# Patient Record
Sex: MALE | Race: WHITE | NOT HISPANIC OR LATINO
[De-identification: names, ages, dates, MRNs, and addresses within clinical notes are randomized per-mention and may not be internally consistent; named-entity substitution may affect disease eponyms.]

---

## 2021-05-04 ENCOUNTER — RESULT REVIEW (OUTPATIENT)
Age: 71
End: 2021-05-04

## 2021-05-04 ENCOUNTER — INPATIENT (INPATIENT)
Facility: HOSPITAL | Age: 71
LOS: 2 days | Discharge: HOME | DRG: 57 | End: 2021-05-07
Attending: INTERNAL MEDICINE | Admitting: INTERNAL MEDICINE
Payer: MEDICARE

## 2021-05-04 VITALS
SYSTOLIC BLOOD PRESSURE: 110 MMHG | HEART RATE: 88 BPM | RESPIRATION RATE: 16 BRPM | OXYGEN SATURATION: 100 % | DIASTOLIC BLOOD PRESSURE: 70 MMHG | TEMPERATURE: 97 F

## 2021-05-04 DIAGNOSIS — N39.0 URINARY TRACT INFECTION, SITE NOT SPECIFIED: ICD-10-CM

## 2021-05-04 DIAGNOSIS — R62.7 ADULT FAILURE TO THRIVE: ICD-10-CM

## 2021-05-04 DIAGNOSIS — G20 PARKINSON'S DISEASE: ICD-10-CM

## 2021-05-04 DIAGNOSIS — I87.8 OTHER SPECIFIED DISORDERS OF VEINS: ICD-10-CM

## 2021-05-04 LAB
ALBUMIN SERPL ELPH-MCNC: 3.4 G/DL — SIGNIFICANT CHANGE UP (ref 3.3–5)
ALP SERPL-CCNC: 79 U/L — SIGNIFICANT CHANGE UP (ref 40–120)
ALT FLD-CCNC: 21 U/L — SIGNIFICANT CHANGE UP (ref 12–78)
ANION GAP SERPL CALC-SCNC: 7 MMOL/L — SIGNIFICANT CHANGE UP (ref 5–17)
APPEARANCE UR: ABNORMAL
APTT BLD: 32 SEC — SIGNIFICANT CHANGE UP (ref 27.5–35.5)
AST SERPL-CCNC: 28 U/L — SIGNIFICANT CHANGE UP (ref 15–37)
BASOPHILS # BLD AUTO: 0.05 K/UL — SIGNIFICANT CHANGE UP (ref 0–0.2)
BASOPHILS NFR BLD AUTO: 0.8 % — SIGNIFICANT CHANGE UP (ref 0–2)
BILIRUB SERPL-MCNC: 0.4 MG/DL — SIGNIFICANT CHANGE UP (ref 0.2–1.2)
BILIRUB UR-MCNC: NEGATIVE — SIGNIFICANT CHANGE UP
BUN SERPL-MCNC: 17 MG/DL — SIGNIFICANT CHANGE UP (ref 7–23)
CALCIUM SERPL-MCNC: 9.3 MG/DL — SIGNIFICANT CHANGE UP (ref 8.5–10.1)
CHLORIDE SERPL-SCNC: 106 MMOL/L — SIGNIFICANT CHANGE UP (ref 96–108)
CK SERPL-CCNC: 371 U/L — HIGH (ref 26–308)
CO2 SERPL-SCNC: 29 MMOL/L — SIGNIFICANT CHANGE UP (ref 22–31)
COLOR SPEC: YELLOW — SIGNIFICANT CHANGE UP
CREAT SERPL-MCNC: 0.93 MG/DL — SIGNIFICANT CHANGE UP (ref 0.5–1.3)
DIFF PNL FLD: ABNORMAL
EOSINOPHIL # BLD AUTO: 0.12 K/UL — SIGNIFICANT CHANGE UP (ref 0–0.5)
EOSINOPHIL NFR BLD AUTO: 1.9 % — SIGNIFICANT CHANGE UP (ref 0–6)
GLUCOSE SERPL-MCNC: 81 MG/DL — SIGNIFICANT CHANGE UP (ref 70–99)
GLUCOSE UR QL: NEGATIVE — SIGNIFICANT CHANGE UP
HCT VFR BLD CALC: 32.4 % — LOW (ref 39–50)
HGB BLD-MCNC: 10.5 G/DL — LOW (ref 13–17)
IMM GRANULOCYTES NFR BLD AUTO: 0.5 % — SIGNIFICANT CHANGE UP (ref 0–1.5)
INR BLD: 1.21 RATIO — HIGH (ref 0.88–1.16)
KETONES UR-MCNC: ABNORMAL
LACTATE SERPL-SCNC: 1.2 MMOL/L — SIGNIFICANT CHANGE UP (ref 0.7–2)
LEUKOCYTE ESTERASE UR-ACNC: ABNORMAL
LYMPHOCYTES # BLD AUTO: 1.38 K/UL — SIGNIFICANT CHANGE UP (ref 1–3.3)
LYMPHOCYTES # BLD AUTO: 21.3 % — SIGNIFICANT CHANGE UP (ref 13–44)
MAGNESIUM SERPL-MCNC: 2.3 MG/DL — SIGNIFICANT CHANGE UP (ref 1.6–2.6)
MCHC RBC-ENTMCNC: 30.7 PG — SIGNIFICANT CHANGE UP (ref 27–34)
MCHC RBC-ENTMCNC: 32.4 GM/DL — SIGNIFICANT CHANGE UP (ref 32–36)
MCV RBC AUTO: 94.7 FL — SIGNIFICANT CHANGE UP (ref 80–100)
MONOCYTES # BLD AUTO: 0.69 K/UL — SIGNIFICANT CHANGE UP (ref 0–0.9)
MONOCYTES NFR BLD AUTO: 10.6 % — SIGNIFICANT CHANGE UP (ref 2–14)
NEUTROPHILS # BLD AUTO: 4.21 K/UL — SIGNIFICANT CHANGE UP (ref 1.8–7.4)
NEUTROPHILS NFR BLD AUTO: 64.9 % — SIGNIFICANT CHANGE UP (ref 43–77)
NITRITE UR-MCNC: NEGATIVE — SIGNIFICANT CHANGE UP
NRBC # BLD: 0 /100 WBCS — SIGNIFICANT CHANGE UP (ref 0–0)
NT-PROBNP SERPL-SCNC: 1940 PG/ML — HIGH (ref 0–125)
PH UR: 8 — SIGNIFICANT CHANGE UP (ref 5–8)
PLATELET # BLD AUTO: 268 K/UL — SIGNIFICANT CHANGE UP (ref 150–400)
POTASSIUM SERPL-MCNC: 3.8 MMOL/L — SIGNIFICANT CHANGE UP (ref 3.5–5.3)
POTASSIUM SERPL-SCNC: 3.8 MMOL/L — SIGNIFICANT CHANGE UP (ref 3.5–5.3)
PROT SERPL-MCNC: 7.8 G/DL — SIGNIFICANT CHANGE UP (ref 6–8.3)
PROT UR-MCNC: 30 MG/DL
PROTHROM AB SERPL-ACNC: 14 SEC — HIGH (ref 10.6–13.6)
RBC # BLD: 3.42 M/UL — LOW (ref 4.2–5.8)
RBC # FLD: 12.5 % — SIGNIFICANT CHANGE UP (ref 10.3–14.5)
SARS-COV-2 RNA SPEC QL NAA+PROBE: SIGNIFICANT CHANGE UP
SODIUM SERPL-SCNC: 142 MMOL/L — SIGNIFICANT CHANGE UP (ref 135–145)
SP GR SPEC: 1.01 — SIGNIFICANT CHANGE UP (ref 1.01–1.02)
TROPONIN I SERPL-MCNC: <.015 NG/ML — SIGNIFICANT CHANGE UP (ref 0.01–0.04)
TSH SERPL-MCNC: 4.6 UIU/ML — HIGH (ref 0.36–3.74)
UROBILINOGEN FLD QL: NEGATIVE — SIGNIFICANT CHANGE UP
WBC # BLD: 6.48 K/UL — SIGNIFICANT CHANGE UP (ref 3.8–10.5)
WBC # FLD AUTO: 6.48 K/UL — SIGNIFICANT CHANGE UP (ref 3.8–10.5)

## 2021-05-04 PROCEDURE — 88304 TISSUE EXAM BY PATHOLOGIST: CPT | Mod: 26

## 2021-05-04 PROCEDURE — 93970 EXTREMITY STUDY: CPT | Mod: 26

## 2021-05-04 PROCEDURE — 99285 EMERGENCY DEPT VISIT HI MDM: CPT | Mod: CS

## 2021-05-04 PROCEDURE — 99221 1ST HOSP IP/OBS SF/LOW 40: CPT

## 2021-05-04 PROCEDURE — 93010 ELECTROCARDIOGRAM REPORT: CPT

## 2021-05-04 PROCEDURE — 71045 X-RAY EXAM CHEST 1 VIEW: CPT | Mod: 26

## 2021-05-04 RX ORDER — CARBIDOPA AND LEVODOPA 25; 100 MG/1; MG/1
1 TABLET ORAL
Qty: 0 | Refills: 0 | DISCHARGE

## 2021-05-04 RX ORDER — NYSTATIN CREAM 100000 [USP'U]/G
1 CREAM TOPICAL
Refills: 0 | Status: DISCONTINUED | OUTPATIENT
Start: 2021-05-04 | End: 2021-05-07

## 2021-05-04 RX ORDER — ACETAMINOPHEN 500 MG
650 TABLET ORAL EVERY 6 HOURS
Refills: 0 | Status: DISCONTINUED | OUTPATIENT
Start: 2021-05-04 | End: 2021-05-07

## 2021-05-04 RX ORDER — CARBIDOPA AND LEVODOPA 25; 100 MG/1; MG/1
2 TABLET ORAL
Refills: 0 | Status: DISCONTINUED | OUTPATIENT
Start: 2021-05-04 | End: 2021-05-07

## 2021-05-04 RX ORDER — CEFTRIAXONE 500 MG/1
1000 INJECTION, POWDER, FOR SOLUTION INTRAMUSCULAR; INTRAVENOUS EVERY 24 HOURS
Refills: 0 | Status: DISCONTINUED | OUTPATIENT
Start: 2021-05-04 | End: 2021-05-06

## 2021-05-04 RX ORDER — SODIUM CHLORIDE 9 MG/ML
1000 INJECTION INTRAMUSCULAR; INTRAVENOUS; SUBCUTANEOUS
Refills: 0 | Status: DISCONTINUED | OUTPATIENT
Start: 2021-05-04 | End: 2021-05-06

## 2021-05-04 RX ORDER — ENOXAPARIN SODIUM 100 MG/ML
40 INJECTION SUBCUTANEOUS DAILY
Refills: 0 | Status: DISCONTINUED | OUTPATIENT
Start: 2021-05-04 | End: 2021-05-07

## 2021-05-04 RX ORDER — CEFTRIAXONE 500 MG/1
1000 INJECTION, POWDER, FOR SOLUTION INTRAMUSCULAR; INTRAVENOUS ONCE
Refills: 0 | Status: COMPLETED | OUTPATIENT
Start: 2021-05-04 | End: 2021-05-04

## 2021-05-04 RX ORDER — SODIUM CHLORIDE 9 MG/ML
1000 INJECTION INTRAMUSCULAR; INTRAVENOUS; SUBCUTANEOUS ONCE
Refills: 0 | Status: COMPLETED | OUTPATIENT
Start: 2021-05-04 | End: 2021-05-04

## 2021-05-04 RX ORDER — CARBIDOPA AND LEVODOPA 25; 100 MG/1; MG/1
1 TABLET ORAL
Refills: 0 | Status: DISCONTINUED | OUTPATIENT
Start: 2021-05-04 | End: 2021-05-04

## 2021-05-04 RX ADMIN — SODIUM CHLORIDE 1000 MILLILITER(S): 9 INJECTION INTRAMUSCULAR; INTRAVENOUS; SUBCUTANEOUS at 11:17

## 2021-05-04 RX ADMIN — ENOXAPARIN SODIUM 40 MILLIGRAM(S): 100 INJECTION SUBCUTANEOUS at 19:05

## 2021-05-04 RX ADMIN — CARBIDOPA AND LEVODOPA 2 TABLET(S): 25; 100 TABLET ORAL at 19:05

## 2021-05-04 RX ADMIN — SODIUM CHLORIDE 1000 MILLILITER(S): 9 INJECTION INTRAMUSCULAR; INTRAVENOUS; SUBCUTANEOUS at 12:17

## 2021-05-04 RX ADMIN — CEFTRIAXONE 100 MILLIGRAM(S): 500 INJECTION, POWDER, FOR SOLUTION INTRAMUSCULAR; INTRAVENOUS at 11:55

## 2021-05-04 RX ADMIN — NYSTATIN CREAM 1 APPLICATION(S): 100000 CREAM TOPICAL at 23:55

## 2021-05-04 RX ADMIN — SODIUM CHLORIDE 50 MILLILITER(S): 9 INJECTION INTRAMUSCULAR; INTRAVENOUS; SUBCUTANEOUS at 23:41

## 2021-05-04 RX ADMIN — CEFTRIAXONE 1000 MILLIGRAM(S): 500 INJECTION, POWDER, FOR SOLUTION INTRAMUSCULAR; INTRAVENOUS at 12:25

## 2021-05-04 NOTE — ED PROVIDER NOTE - PR
Epidermal Autograft Text: The defect edges were debeveled with a #15 scalpel blade.  Given the location of the defect, shape of the defect and the proximity to free margins an epidermal autograft was deemed most appropriate.  Using a sterile surgical marker, the primary defect shape was transferred to the donor site. The epidermal graft was then harvested.  The skin graft was then placed in the primary defect and oriented appropriately. 134

## 2021-05-04 NOTE — H&P ADULT - HISTORY OF PRESENT ILLNESS
Pt is a 72 yo male who presents to the ED with a cc of weakness and inability to care for himself. PMhx of Parkinson disease, generalized weakness. Pt is a poor historian. Unsure what medication he use to be on but reports that he ran out of his medication 6 months ago and no longer takes any pills. He reports that he does not follow with a PMD. Pt states that for the last 6 months he has become progressively weaker. He reports that for the last 2 weeks he has been having difficulty ambulating and has mostly been sitting in his recliner. Initially he was able to walk back and forth to the restroom and cook some meals but over the last several days he has been unable to get out of the recliner. Today per reports his sisters came to check on him and found him in the recliner covered in urine and feces. EMS was called and pt was taken to the ED for further work up. Denies fever, chills, N/V, CP, SOB, abd pain. Pt does report increased tremors and generalized weakness      Pt is a 72 yo male who presents to the ED with a cc of weakness and inability to care for himself. PMhx of Parkinson disease, generalized weakness. Pt is a poor historian. Unsure what medication he use to be on but reports that he ran out of his medication 6 months ago and no longer takes any pills. He reports that he does not follow with a PMD. Pt states that for the last 6 months he has become progressively weaker. He reports that for the last 2 weeks he has been having difficulty ambulating and has mostly been sitting in his recliner. Initially he was able to walk back and forth to the restroom and cook some meals but over the last several days he has been unable to get out of the recliner. Today per reports his sisters came to check on him and found him in the recliner covered in urine and feces. EMS was called and pt was taken to the ED for further work up. Denies fever, chills, N/V, CP, SOB, abd pain. Pt does report increased tremors and generalized weakness.

## 2021-05-04 NOTE — H&P ADULT - MS GEN HX ROS MEA POS PC
Was the patient seen in the last year in this department? Yes     Does patient have an active prescription for medications requested? No     Received Request Via: Pharmacy   muscle weakness

## 2021-05-04 NOTE — ED PROVIDER NOTE - CLINICAL SUMMARY MEDICAL DECISION MAKING FREE TEXT BOX
Pt is a 70 yo male who presents to the ED with a cc of weakness and inability to care for himself. PMhx of Parkinson disease, generalized weakness. Pt is a poor historian. Unsure what medication he use to be on but reports that he ran out of his medication 6 months ago and no longer takes any pills. He reports that he does not follow with a PMD. Pt states that for the last 6 months he has become progressively weaker. He reports that for the last 2 weeks he has been having difficulty ambulating and has mostly been sitting in his recliner. Initially he was able to walk back and forth to the restroom and cook some meals but over the last several days he has been unable to get out of the recliner. Today per reports his sisters came to check on him and found him in the recliner covered in urine and feces. EMS was called and pt was taken to the ED for further work up. Denies fever, chills, N/V, CP, SOB, abd pain. Pt does report increased tremors and generalized weakness Pt is a 70 yo male who presents to the ED with a cc of weakness and inability to care for himself. PMhx of Parkinson disease, generalized weakness. Pt is a poor historian. Unsure what medication he use to be on but reports that he ran out of his medication 6 months ago and no longer takes any pills. He reports that he does not follow with a PMD. Pt states that for the last 6 months he has become progressively weaker. He reports that for the last 2 weeks he has been having difficulty ambulating and has mostly been sitting in his recliner. Initially he was able to walk back and forth to the restroom and cook some meals but over the last several days he has been unable to get out of the recliner. Today per reports his sisters came to check on him and found him in the recliner covered in urine and feces. EMS was called and pt was taken to the ED for further work up. Denies fever, chills, N/V, CP, SOB, abd pain. Pt does report increased tremors and generalized weakness. Pt with inability to ambulate, generalized weakness, and failure to thrive. Also non compliant with medication. Found home covered in urine and feces with wounds noted to bilateral lower ext. Will obtain screening septic work up, begin hydration, clean wounds, obtain venous duplex, consult wound care and monitor. Pt will likely require admission as it appears he is unable to care for himself at home at this time

## 2021-05-04 NOTE — ED PROVIDER NOTE - CARE PLAN
Principal Discharge DX:	UTI (urinary tract infection)  Secondary Diagnosis:	Failure to thrive in adult  Secondary Diagnosis:	Generalized weakness  Secondary Diagnosis:	Venous stasis  Secondary Diagnosis:	Skin ulceration

## 2021-05-04 NOTE — ED PROVIDER NOTE - PHYSICAL EXAMINATION
stage 1 ulceration to his right and left hip region   stage 1 ulceration to his sacral region with small area of stage 2 ulceration and mild skin breakdown  patches of blanchable skin irritation to his truncal region and abdomen  diffuse swelling to his scrotum likely from dependent edema with skin excoriation  skin excortiation to bilateral upper inner thighs  +4-6 pitting edema to bilateral lower ext with areas of skin breakdown noted to calf regions and venous stasis changes noted diffuse redness noted with increased warmth   dry blood noted to bilateral feet   +pedal pulses bilaterally stage 1 ulceration to his right and left hip region   stage 1 ulceration to his sacral region with small area of stage 2 ulceration and mild skin breakdown  patches of blanchable skin irritation to his truncal region and abdomen  diffuse swelling to his scrotum likely from dependent edema with skin excoriation  skin excortication to bilateral upper inner thighs  +4-6 pitting edema to bilateral lower ext with areas of skin breakdown noted to calf regions and venous stasis changes noted diffuse redness noted with increased warmth   dry blood noted to bilateral feet   +pedal pulses bilaterally

## 2021-05-04 NOTE — PATIENT PROFILE ADULT - MEDICATION/VISITS - DETAILS
stopped taking medication because felt medication wasn't helping and didn't want to go to the doctors because of COVID virus

## 2021-05-04 NOTE — CONSULT NOTE ADULT - SUBJECTIVE AND OBJECTIVE BOX
71y year old Male seen at [facility] APER 03 for b/l superficial anterior leg wound and maggots on feet.  Pt was seen bedside AAOx3 with NAD.  Pt said he has Parkinson's disease and not able to clean his feet.  Pt said he did not know how long he has had the superficial leg wounds and he said he is not receiving any treatment for the wounds.  Pt said he has not been to [facility].  Denies any fever, chills, nausea, vomiting, chest pain, shortness of breath, or calf pain at this time.    REVIEW OF SYSTEMS    PAST MEDICAL & SURGICAL HISTORY:  Parkinson disease    No significant past surgical history        Allergies    No Known Allergies    Intolerances        MEDICATIONS  (STANDING):    MEDICATIONS  (PRN):      Social History:      FAMILY HISTORY:      Vital Signs Last 24 Hrs  T(C): 36.7 (04 May 2021 10:30), Max: 36.7 (04 May 2021 10:30)  T(F): 98.1 (04 May 2021 10:30), Max: 98.1 (04 May 2021 10:30)  HR: 88 (04 May 2021 10:03) (88 - 88)  BP: 110/70 (04 May 2021 10:03) (110/70 - 110/70)  BP(mean): --  RR: 16 (04 May 2021 10:03) (16 - 16)  SpO2: 100% (04 May 2021 10:03) (100% - 100%)    PHYSICAL EXAM:  Vascular: DP & PT palpable bilaterally, Capillary refill 3 seconds, Digital hair present bilaterally. 2+ edema to leg and dorsal foot b/l.  No ecchymosis b/l  Neurological: Light touch sensation intact bilaterally  Musculoskeletal: 5/5 strength in all quadrants bilaterally, AJ & STJ ROM intact  Dermatological:  Superficial wounds anterior leg b/l.  Epithelial tissue pealing b/l anterior legs withe granular base.  Periwound macerated.  No PTB or SOI.  No tunneling, undermining, malodor, purulence or streaking.  Serous drainage  Thick discolored pedal nails 1-10 with subungual debris with malodor  Maggots between interspaces 1-4 b/l    CBC Full  -  ( 04 May 2021 11:10 )  WBC Count : 6.48 K/uL  RBC Count : 3.42 M/uL  Hemoglobin : 10.5 g/dL  Hematocrit : 32.4 %  Platelet Count - Automated : 268 K/uL  Mean Cell Volume : 94.7 fl  Mean Cell Hemoglobin : 30.7 pg  Mean Cell Hemoglobin Concentration : 32.4 gm/dL  Auto Neutrophil # : 4.21 K/uL  Auto Lymphocyte # : 1.38 K/uL  Auto Monocyte # : 0.69 K/uL  Auto Eosinophil # : 0.12 K/uL  Auto Basophil # : 0.05 K/uL  Auto Neutrophil % : 64.9 %  Auto Lymphocyte % : 21.3 %  Auto Monocyte % : 10.6 %  Auto Eosinophil % : 1.9 %  Auto Basophil % : 0.8 %      ----------CHEM PANEL----------    05-04    142  |  106  |  17  ----------------------------<  81  3.8   |  29  |  0.93    Ca    9.3      04 May 2021 11:10  Mg     2.3     05-04    TPro  7.8  /  Alb  3.4  /  TBili  0.4  /  DBili  x   /  AST  28  /  ALT  21  /  AlkPhos  79  05-04        Imaging: ----------  No pedal

## 2021-05-04 NOTE — ED PROVIDER NOTE - PROGRESS NOTE DETAILS
after cleaning feet off of dry blood maggots were noted in the inter digitious spaces wound care consult placed attempt to reach pt emergency contact no  and mailbox was full

## 2021-05-04 NOTE — ED PROVIDER NOTE - OBJECTIVE STATEMENT
Pt is a 70 yo male who presents to the ED with a cc of weakness and inability to care for himself. PMhx of Parkinson disease, generalized weakness. Pt is a poor historian. Unsure what medication he use to be on but reports that he ran out of his medication 6 months ago and no longer takes any pills. He reports that he does not follow with a PMD. Pt states that for the last 6 months he has become progressively weaker. He reports that for the last 2 weeks he has been having difficulty ambulating and has mostly been sitting in his recliner. Initially he was able to walk back and forth to the restroom and cook some meals but over the last several days he has been unable to get out of the recliner. Today per reports his sisters came to check on him and found him in the recliner covered in urine and feces. EMS was called and pt was taken to the ED for further work up. Denies fever, chills, N/V, CP, SOB, abd pain. Pt does report increased tremors and generalized weakness

## 2021-05-04 NOTE — H&P ADULT - PROBLEM SELECTOR PLAN 4
pt with social issues  sw eval  will likely need placement  supportive care  nutrtion eval  check tsh, b12, fa

## 2021-05-04 NOTE — ED ADULT NURSE NOTE - OBJECTIVE STATEMENT
Received pt in bed alert and oriented x4.  C/O weakness.  pt from home and fell about 2 weeks ago and stated hes been having more and more trouble getting around and walking. Pt denies any fevers, abdominal pain, chest pin or SOB.  pt has bilateral redness and swelling to lower extremities with multiple wounds which he stated has been going on for month. EKG completed. Received pt in bed alert and oriented x4.  C/O weakness.  pt from home and fell about 2 weeks ago and stated hes been having more and more trouble getting around and walking. Pt denies any fevers, abdominal pain, chest pin or SOB.  pt has bilateral redness and swelling to lower extremities with multiple wounds which he stated has been going on for month.  Pt has necrotic wounds on feet.  Pt also has magets coming out of wounds. EKG completed.

## 2021-05-04 NOTE — CONSULT NOTE ADULT - ASSESSMENT
Superficial anterior leg wounds b/l    PROBLEM/RECOMMENDATIONS:    Pt evaluated and chart reviewed  Legs and feet cleansed with Dakins solution  Legs cleansed with NS and dressed with DSD  Patient examined and evaluated at this time.  Nails 1-5 bilaterally debrided without incident  Patient tolerated procedure well.  Patient may follow up with outside podiatrist for continued care  No podiatry sx intervention at this time and local wound care at this time  All of pt's questions were answered  Pt stated he understood all discussed  Podiatry will follow pt while in house    Wound Care Instructions  1.  Keep dressing clean, dry and intact until clinic visit  2.  Make appointment to be seen by Dr. Sanchez or Dr. Torres at [facility] w/in 3-5 days of d/c  3.  If symptoms of nausea, vomiting, fever, chills, shortness of breath, chest pain, increasing pain foot or posterior leg pain develop - go to the emergency department.

## 2021-05-05 LAB
ANION GAP SERPL CALC-SCNC: 5 MMOL/L — SIGNIFICANT CHANGE UP (ref 5–17)
BUN SERPL-MCNC: 15 MG/DL — SIGNIFICANT CHANGE UP (ref 7–23)
CALCIUM SERPL-MCNC: 8.4 MG/DL — LOW (ref 8.5–10.1)
CHLORIDE SERPL-SCNC: 111 MMOL/L — HIGH (ref 96–108)
CO2 SERPL-SCNC: 28 MMOL/L — SIGNIFICANT CHANGE UP (ref 22–31)
COVID-19 SPIKE DOMAIN AB INTERP: NEGATIVE — SIGNIFICANT CHANGE UP
COVID-19 SPIKE DOMAIN ANTIBODY RESULT: 0.4 U/ML — SIGNIFICANT CHANGE UP
CREAT SERPL-MCNC: 0.82 MG/DL — SIGNIFICANT CHANGE UP (ref 0.5–1.3)
GLUCOSE SERPL-MCNC: 75 MG/DL — SIGNIFICANT CHANGE UP (ref 70–99)
HCT VFR BLD CALC: 27.1 % — LOW (ref 39–50)
HCV AB S/CO SERPL IA: 0.13 S/CO — SIGNIFICANT CHANGE UP (ref 0–0.99)
HCV AB SERPL-IMP: SIGNIFICANT CHANGE UP
HGB BLD-MCNC: 8.8 G/DL — LOW (ref 13–17)
MCHC RBC-ENTMCNC: 30.7 PG — SIGNIFICANT CHANGE UP (ref 27–34)
MCHC RBC-ENTMCNC: 32.5 GM/DL — SIGNIFICANT CHANGE UP (ref 32–36)
MCV RBC AUTO: 94.4 FL — SIGNIFICANT CHANGE UP (ref 80–100)
NRBC # BLD: 0 /100 WBCS — SIGNIFICANT CHANGE UP (ref 0–0)
PLATELET # BLD AUTO: 235 K/UL — SIGNIFICANT CHANGE UP (ref 150–400)
POTASSIUM SERPL-MCNC: 3.8 MMOL/L — SIGNIFICANT CHANGE UP (ref 3.5–5.3)
POTASSIUM SERPL-SCNC: 3.8 MMOL/L — SIGNIFICANT CHANGE UP (ref 3.5–5.3)
RBC # BLD: 2.87 M/UL — LOW (ref 4.2–5.8)
RBC # FLD: 12.7 % — SIGNIFICANT CHANGE UP (ref 10.3–14.5)
SARS-COV-2 IGG+IGM SERPL QL IA: 0.4 U/ML — SIGNIFICANT CHANGE UP
SARS-COV-2 IGG+IGM SERPL QL IA: NEGATIVE — SIGNIFICANT CHANGE UP
SODIUM SERPL-SCNC: 144 MMOL/L — SIGNIFICANT CHANGE UP (ref 135–145)
WBC # BLD: 5.84 K/UL — SIGNIFICANT CHANGE UP (ref 3.8–10.5)
WBC # FLD AUTO: 5.84 K/UL — SIGNIFICANT CHANGE UP (ref 3.8–10.5)

## 2021-05-05 RX ORDER — BNT162B2 0.23 MG/2.25ML
0.3 INJECTION, SUSPENSION INTRAMUSCULAR ONCE
Refills: 0 | Status: COMPLETED | OUTPATIENT
Start: 2021-05-05 | End: 2021-05-05

## 2021-05-05 RX ORDER — SODIUM CHLORIDE 9 MG/ML
1000 INJECTION INTRAMUSCULAR; INTRAVENOUS; SUBCUTANEOUS ONCE
Refills: 0 | Status: COMPLETED | OUTPATIENT
Start: 2021-05-05 | End: 2021-05-05

## 2021-05-05 RX ADMIN — NYSTATIN CREAM 1 APPLICATION(S): 100000 CREAM TOPICAL at 17:38

## 2021-05-05 RX ADMIN — SODIUM CHLORIDE 1000 MILLILITER(S): 9 INJECTION INTRAMUSCULAR; INTRAVENOUS; SUBCUTANEOUS at 07:38

## 2021-05-05 RX ADMIN — CARBIDOPA AND LEVODOPA 2 TABLET(S): 25; 100 TABLET ORAL at 05:23

## 2021-05-05 RX ADMIN — NYSTATIN CREAM 1 APPLICATION(S): 100000 CREAM TOPICAL at 05:23

## 2021-05-05 RX ADMIN — BNT162B2 0.3 MILLILITER(S): 0.23 INJECTION, SUSPENSION INTRAMUSCULAR at 16:43

## 2021-05-05 RX ADMIN — NYSTATIN CREAM 1 APPLICATION(S): 100000 CREAM TOPICAL at 05:24

## 2021-05-05 RX ADMIN — CEFTRIAXONE 100 MILLIGRAM(S): 500 INJECTION, POWDER, FOR SOLUTION INTRAMUSCULAR; INTRAVENOUS at 11:53

## 2021-05-05 RX ADMIN — ENOXAPARIN SODIUM 40 MILLIGRAM(S): 100 INJECTION SUBCUTANEOUS at 11:53

## 2021-05-05 RX ADMIN — CARBIDOPA AND LEVODOPA 2 TABLET(S): 25; 100 TABLET ORAL at 17:38

## 2021-05-05 RX ADMIN — SODIUM CHLORIDE 50 MILLILITER(S): 9 INJECTION INTRAMUSCULAR; INTRAVENOUS; SUBCUTANEOUS at 21:26

## 2021-05-05 NOTE — DIETITIAN INITIAL EVALUATION ADULT. - OTHER INFO
As per chart pt is a 71 year old male with a PMH of with PMH of Parkinson, admitted with UTI.    Pt seen at bedside. Pt reports currently good appetite and PO intake, states that he consumed about 90% of breakfast meal today. Pt's admission weight 134.15lbs, current weight per chart (5/5) 134.9lbs. Pt reports current weight 125-130lbs, states that he weighed about 150-180lbs x 5 years ago, but admits to weight being stable at current weight for 2-3 years. Pt appears larger then states and current weights, continue to monitor. States that he used to have a swallowing difficulty due to Parkinson's but denies having any issues at this time. Denies currently any nausea/ vomiting/ diarrhea/ constipation, no BM since admission.     Stage 2 left buttock, left hip; Unstagable left 1st toe pressure injuries

## 2021-05-05 NOTE — DIETITIAN INITIAL EVALUATION ADULT. - ADD RECOMMEND
1) Continue with current Regular diet, 2) Pt declined Ensure Enlive, agreeable to trial of health shakes, 3) Encourage adequate PO intake, food preferences obtained and will be honored, 4) Recommend Vitamin C 500mg BID, MVI/daily, 5) Monitor pt's PO intake, weight, skin, edema, GI distress

## 2021-05-05 NOTE — PHYSICAL THERAPY INITIAL EVALUATION ADULT - TRANSFER SAFETY CONCERNS NOTED: SIT/STAND, REHAB EVAL
cues for safety, flexed knees/decreased balance during turns/losing balance/decreased proprioception/decreased sequencing ability/decreased weight-shifting ability

## 2021-05-05 NOTE — PHYSICAL THERAPY INITIAL EVALUATION ADULT - IMPAIRMENTS FOUND, PT EVAL
aerobic capacity/endurance/arousal, attention, and cognition/gait, locomotion, and balance/gross motor/integumentary integrity/muscle strength/poor safety awareness/ROM/tone

## 2021-05-05 NOTE — PHYSICAL THERAPY INITIAL EVALUATION ADULT - PERTINENT HX OF CURRENT PROBLEM, REHAB EVAL
As per H&P:"Pt is a 72 yo male who presents to the ED with a cc of weakness and inability to care for himself. PMhx of Parkinson disease, generalized weakness. Pt is a poor historian. Unsure what medication he use to be on but reports that he ran out of his medication 6 months ago and no longer takes any pills. He reports that he does not follow with a PMD. Pt states that for the last 6 months he has become progressively weaker."

## 2021-05-05 NOTE — PHYSICAL THERAPY INITIAL EVALUATION ADULT - IMPAIRED TRANSFERS: SIT/STAND, REHAB EVAL
Take meclizine 25mg three times a day for dizziness. This medication may make you tired. Do not drive after taking. Begin levaquin 500mg daily for 10days.  Follow up if symptoms persist.
impaired balance/impaired coordination/decreased flexibility/abnormal muscle tone/impaired postural control/decreased ROM/decreased strength

## 2021-05-05 NOTE — PHYSICAL THERAPY INITIAL EVALUATION ADULT - RANGE OF MOTION EXAMINATION, REHAB EVAL
limited end range to knee extension, abnormal tone but still WFL/bilateral upper extremity ROM was WFL (within functional limits)/bilateral lower extremity ROM was WFL (within functional limits)/deficits as listed below

## 2021-05-05 NOTE — PHYSICAL THERAPY INITIAL EVALUATION ADULT - ADDITIONAL COMMENTS
Pt lives at home in a private house alone /c 2 SHIRA and 1 rail and none inside to negotiate. Pt reports he was independent /c all functional mobility and ADLs prior to admission but for the last few weeks has been getting weaker and having difficulties ambulating himself and managing his hygiene. Pt drives and does have a rolling walker? Pt does not use these adaptive or assistive devices prior to admission.

## 2021-05-05 NOTE — PHYSICAL THERAPY INITIAL EVALUATION ADULT - GENERAL OBSERVATIONS, REHAB EVAL
Pt rec'd in 1 east bed /c venous statis and skin ulcerations noted to Bilateral feet, gauze wraps and reddish rash to trunk. Pt is pleasant and cooperative /c PT eval.

## 2021-05-05 NOTE — DIETITIAN INITIAL EVALUATION ADULT. - ORAL INTAKE PTA/DIET HISTORY
Calm/Appropriate Pt reports fair appetite and PO intake, however states that he now only consumes 2 meals a day as it takes him a while to cook and clean up. Cooks mainly easy things such as eggs, grilled cheese, tuna, chicken, pre-cooked meals from supermarket; states his sisters bring him food 2x week from take-out place such as [employer], Chinese foods. Also consumes a lot of cookies/ cakes and other desert foods. Sister does food shopping for him. Supplement use PTA includes MVI. NKFA.

## 2021-05-05 NOTE — PHYSICAL THERAPY INITIAL EVALUATION ADULT - SKIN COLOR/CHARACTERISTICS
venous statis and skin ulcerations to shins and feet bilateral, red raised rash noted to trunk as well

## 2021-05-05 NOTE — PHYSICAL THERAPY INITIAL EVALUATION ADULT - GAIT DEVIATIONS NOTED, PT EVAL
NBOS, shuffles, unsteady and festination/decreased seth/decreased velocity of limb motion/decreased step length/decreased weight-shifting ability

## 2021-05-05 NOTE — PHYSICAL THERAPY INITIAL EVALUATION ADULT - IMPAIRMENTS CONTRIBUTING TO GAIT DEVIATIONS, PT EVAL
impaired balance/impaired coordination/decreased flexibility/impaired motor control/abnormal muscle tone/impaired postural control/decreased ROM/decreased strength

## 2021-05-06 ENCOUNTER — TRANSCRIPTION ENCOUNTER (OUTPATIENT)
Age: 71
End: 2021-05-06

## 2021-05-06 LAB
ANION GAP SERPL CALC-SCNC: 4 MMOL/L — LOW (ref 5–17)
BUN SERPL-MCNC: 17 MG/DL — SIGNIFICANT CHANGE UP (ref 7–23)
CALCIUM SERPL-MCNC: 8.3 MG/DL — LOW (ref 8.5–10.1)
CHLORIDE SERPL-SCNC: 110 MMOL/L — HIGH (ref 96–108)
CO2 SERPL-SCNC: 30 MMOL/L — SIGNIFICANT CHANGE UP (ref 22–31)
CREAT SERPL-MCNC: 0.86 MG/DL — SIGNIFICANT CHANGE UP (ref 0.5–1.3)
CULTURE RESULTS: SIGNIFICANT CHANGE UP
GLUCOSE SERPL-MCNC: 96 MG/DL — SIGNIFICANT CHANGE UP (ref 70–99)
HCT VFR BLD CALC: 28.4 % — LOW (ref 39–50)
HGB BLD-MCNC: 8.8 G/DL — LOW (ref 13–17)
MCHC RBC-ENTMCNC: 29.5 PG — SIGNIFICANT CHANGE UP (ref 27–34)
MCHC RBC-ENTMCNC: 31 GM/DL — LOW (ref 32–36)
MCV RBC AUTO: 95.3 FL — SIGNIFICANT CHANGE UP (ref 80–100)
NRBC # BLD: 0 /100 WBCS — SIGNIFICANT CHANGE UP (ref 0–0)
PLATELET # BLD AUTO: 236 K/UL — SIGNIFICANT CHANGE UP (ref 150–400)
POTASSIUM SERPL-MCNC: 3.8 MMOL/L — SIGNIFICANT CHANGE UP (ref 3.5–5.3)
POTASSIUM SERPL-SCNC: 3.8 MMOL/L — SIGNIFICANT CHANGE UP (ref 3.5–5.3)
RBC # BLD: 2.98 M/UL — LOW (ref 4.2–5.8)
RBC # FLD: 12.8 % — SIGNIFICANT CHANGE UP (ref 10.3–14.5)
SARS-COV-2 RNA SPEC QL NAA+PROBE: SIGNIFICANT CHANGE UP
SODIUM SERPL-SCNC: 144 MMOL/L — SIGNIFICANT CHANGE UP (ref 135–145)
SPECIMEN SOURCE: SIGNIFICANT CHANGE UP
WBC # BLD: 4.81 K/UL — SIGNIFICANT CHANGE UP (ref 3.8–10.5)
WBC # FLD AUTO: 4.81 K/UL — SIGNIFICANT CHANGE UP (ref 3.8–10.5)

## 2021-05-06 PROCEDURE — 99232 SBSQ HOSP IP/OBS MODERATE 35: CPT

## 2021-05-06 RX ADMIN — NYSTATIN CREAM 1 APPLICATION(S): 100000 CREAM TOPICAL at 05:46

## 2021-05-06 RX ADMIN — NYSTATIN CREAM 1 APPLICATION(S): 100000 CREAM TOPICAL at 17:34

## 2021-05-06 RX ADMIN — CARBIDOPA AND LEVODOPA 2 TABLET(S): 25; 100 TABLET ORAL at 05:46

## 2021-05-06 RX ADMIN — ENOXAPARIN SODIUM 40 MILLIGRAM(S): 100 INJECTION SUBCUTANEOUS at 11:37

## 2021-05-06 RX ADMIN — CARBIDOPA AND LEVODOPA 2 TABLET(S): 25; 100 TABLET ORAL at 17:34

## 2021-05-06 NOTE — DISCHARGE NOTE PROVIDER - HOSPITAL COURSE
UTI ruled out  Dehydration  Failure to Thrive  Parkinsons dementia    stable for dc  >35 minutes spent on discharge   UTI ruled out  Dehydration  Failure to Thrive  Parkinsons dementia  Right groin leison    stable for dc  >35 minutes spent on discharge

## 2021-05-06 NOTE — DISCHARGE NOTE PROVIDER - NSDCCPCAREPLAN_GEN_ALL_CORE_FT
PRINCIPAL DISCHARGE DIAGNOSIS  Diagnosis: Parkinson disease  Assessment and Plan of Treatment: continue current medications  for [facility]  neuro fu at [facility]      SECONDARY DISCHARGE DIAGNOSES  Diagnosis: Generalized weakness  Assessment and Plan of Treatment:     Diagnosis: Skin ulceration  Assessment and Plan of Treatment:     Diagnosis: Failure to thrive in adult  Assessment and Plan of Treatment:     Diagnosis: Venous stasis  Assessment and Plan of Treatment:     Diagnosis: Parkinson disease  Assessment and Plan of Treatment:      PRINCIPAL DISCHARGE DIAGNOSIS  Diagnosis: Parkinson disease  Assessment and Plan of Treatment: continue current medications  for [facility]  neuro fu at [facility]      SECONDARY DISCHARGE DIAGNOSES  Diagnosis: Skin ulceration  Assessment and Plan of Treatment: wound/leison in right groin  fu with dermatology as outpt for further tx and management

## 2021-05-06 NOTE — DISCHARGE NOTE PROVIDER - NSDCMRMEDTOKEN_GEN_ALL_CORE_FT
carbidopa-levodopa 50 mg-200 mg oral tablet, extended release: 1 tab(s) orally 2 times a day    *Rx last filled 05/28/20 for qty 60. No fills after that.

## 2021-05-06 NOTE — PROGRESS NOTE ADULT - PROBLEM SELECTOR PLAN 4
pt with social issues  sw eval  will likely need placement  supportive care  nutrtion eval
for dc to [facility]

## 2021-05-07 ENCOUNTER — TRANSCRIPTION ENCOUNTER (OUTPATIENT)
Age: 71
End: 2021-05-07

## 2021-05-07 VITALS
OXYGEN SATURATION: 98 % | HEART RATE: 88 BPM | TEMPERATURE: 99 F | DIASTOLIC BLOOD PRESSURE: 55 MMHG | RESPIRATION RATE: 17 BRPM | SYSTOLIC BLOOD PRESSURE: 93 MMHG

## 2021-05-07 DIAGNOSIS — T14.8XXA OTHER INJURY OF UNSPECIFIED BODY REGION, INITIAL ENCOUNTER: ICD-10-CM

## 2021-05-07 PROCEDURE — U0005: CPT

## 2021-05-07 PROCEDURE — 83605 ASSAY OF LACTIC ACID: CPT

## 2021-05-07 PROCEDURE — 85027 COMPLETE CBC AUTOMATED: CPT

## 2021-05-07 PROCEDURE — 82550 ASSAY OF CK (CPK): CPT

## 2021-05-07 PROCEDURE — 83880 ASSAY OF NATRIURETIC PEPTIDE: CPT

## 2021-05-07 PROCEDURE — 85610 PROTHROMBIN TIME: CPT

## 2021-05-07 PROCEDURE — 88304 TISSUE EXAM BY PATHOLOGIST: CPT

## 2021-05-07 PROCEDURE — 97116 GAIT TRAINING THERAPY: CPT

## 2021-05-07 PROCEDURE — 97162 PT EVAL MOD COMPLEX 30 MIN: CPT

## 2021-05-07 PROCEDURE — 80053 COMPREHEN METABOLIC PANEL: CPT

## 2021-05-07 PROCEDURE — 85730 THROMBOPLASTIN TIME PARTIAL: CPT

## 2021-05-07 PROCEDURE — 96365 THER/PROPH/DIAG IV INF INIT: CPT

## 2021-05-07 PROCEDURE — 36415 COLL VENOUS BLD VENIPUNCTURE: CPT

## 2021-05-07 PROCEDURE — 84484 ASSAY OF TROPONIN QUANT: CPT

## 2021-05-07 PROCEDURE — 87086 URINE CULTURE/COLONY COUNT: CPT

## 2021-05-07 PROCEDURE — U0003: CPT

## 2021-05-07 PROCEDURE — 80048 BASIC METABOLIC PNL TOTAL CA: CPT

## 2021-05-07 PROCEDURE — 97110 THERAPEUTIC EXERCISES: CPT

## 2021-05-07 PROCEDURE — 85025 COMPLETE CBC W/AUTO DIFF WBC: CPT

## 2021-05-07 PROCEDURE — 99285 EMERGENCY DEPT VISIT HI MDM: CPT | Mod: 25

## 2021-05-07 PROCEDURE — 93005 ELECTROCARDIOGRAM TRACING: CPT

## 2021-05-07 PROCEDURE — 86803 HEPATITIS C AB TEST: CPT

## 2021-05-07 PROCEDURE — 81001 URINALYSIS AUTO W/SCOPE: CPT

## 2021-05-07 PROCEDURE — 83735 ASSAY OF MAGNESIUM: CPT

## 2021-05-07 PROCEDURE — 71045 X-RAY EXAM CHEST 1 VIEW: CPT

## 2021-05-07 PROCEDURE — 93970 EXTREMITY STUDY: CPT

## 2021-05-07 PROCEDURE — 86769 SARS-COV-2 COVID-19 ANTIBODY: CPT

## 2021-05-07 PROCEDURE — 87040 BLOOD CULTURE FOR BACTERIA: CPT

## 2021-05-07 PROCEDURE — 84443 ASSAY THYROID STIM HORMONE: CPT

## 2021-05-07 RX ADMIN — NYSTATIN CREAM 1 APPLICATION(S): 100000 CREAM TOPICAL at 04:55

## 2021-05-07 RX ADMIN — CARBIDOPA AND LEVODOPA 2 TABLET(S): 25; 100 TABLET ORAL at 04:55

## 2021-05-07 RX ADMIN — ENOXAPARIN SODIUM 40 MILLIGRAM(S): 100 INJECTION SUBCUTANEOUS at 11:00

## 2021-05-07 NOTE — PROGRESS NOTE ADULT - ASSESSMENT
Superficial anterior leg wounds b/l    PROBLEM/RECOMMENDATIONS:    Pt evaluated and chart reviewed  Dressings left intact  Order placed with nursing for dressing change  Nails 1-5 bilaterally debrided previously without incident  Patient tolerated procedure well  Patient may follow up with outside podiatrist for continued care  No podiatry sx intervention at this time and local wound care at this time  All of pt's questions were answered  Pt stated he understood all discussed  Podiatry will follow pt while in house    Wound Care Instructions  1.  Keep dressing clean, dry and intact until clinic visit  2.  Make appointment to be seen by Dr. Sanchez or Dr. Torres at [facility] w/in 3-5 days of d/c  3.  If symptoms of nausea, vomiting, fever, chills, shortness of breath, chest pain, increasing pain foot or posterior leg pain develop - go to the emergency department. 
Superficial anterior leg wounds b/l    PROBLEM/RECOMMENDATIONS:    Pt evaluated and chart reviewed  Dressings left intact  Nails 1-5 bilaterally debrided previously without incident  Patient tolerated procedure well  Patient may follow up with outside podiatrist for continued care  No podiatry sx intervention at this time and local wound care at this time  All of pt's questions were answered  Pt stated he understood all discussed  Podiatry will follow pt while in house    Wound Care Instructions  1.  Keep dressing clean, dry and intact until clinic visit  2.  Make appointment to be seen by Dr. Sanchez or Dr. Torres at [facility] w/in 3-5 days of d/c  3.  If symptoms of nausea, vomiting, fever, chills, shortness of breath, chest pain, increasing pain foot or posterior leg pain develop - go to the emergency department.

## 2021-05-07 NOTE — PROGRESS NOTE ADULT - PROBLEM SELECTOR PLAN 1
continue home dose of carbidopa  pt and sw eval
dc iv abx  no signs of uti  stable
ua noted  fu urine cultures  ivf  iv rocephin  trend labs

## 2021-05-07 NOTE — PROGRESS NOTE ADULT - PROBLEM SELECTOR PLAN 3
anuja in right groin - per discussion with pt there for 17 years  fu with dermatology as outpt  discussed with pt
podiatry eval noted  will need outpt wound care fu  pt eval noted for dc to [facility]
podiatry eval noted  will need outpt wound care fu  pt eval

## 2021-05-07 NOTE — DISCHARGE NOTE NURSING/CASE MANAGEMENT/SOCIAL WORK - PATIENT PORTAL LINK FT
You can access the FollowMyHealth Patient Portal offered by [facility] by registering at the following website: [email or web address]. By joining [facility]’s FollowMyHealth portal, you will also be able to view your health information using other applications (apps) compatible with our system.

## 2021-05-07 NOTE — PROGRESS NOTE ADULT - SUBJECTIVE AND OBJECTIVE BOX
71y year old Male seen at [facility] APER 03 for b/l superficial anterior leg wound and maggots on feet.  Pt was seen bedside AAOx3 with NAD.  Dressings were clean, dry and intact b/l.  Denies any fever, chills, nausea, vomiting, chest pain, shortness of breath, or calf pain at this time.    REVIEW OF SYSTEMS    CONSTITUTIONAL: No fever, weight loss, or fatigue  EYES: No eye pain, visual disturbances  ENMT:  No difficulty hearing, tinnitus, vertigo; No sinus or throat pain  NECK: No pain or stiffness  RESPIRATORY: No cough, wheezing, chills or hemoptysis; No shortness of breath  CARDIOVASCULAR: No chest pain, palpitations, dizziness  GASTROINTESTINAL: No abdominal or epigastric pain. No nausea, vomiting, or hematemesis; No diarrhea or constipation. No melena or hematochezia.  GENITOURINARY: No dysuria, frequency, hematuria, or incontinence  NEUROLOGICAL: No headaches, memory loss, loss of strength, numbness, or tremors  SKIN: No itching, burning  LYMPH NODES: No enlarged glands  AST MEDICAL & SURGICAL HISTORY:  Parkinson disease    No significant past surgical history        Allergies    No Known Allergies    Intolerances        MEDICATIONS  (STANDING):    MEDICATIONS  (PRN):      Social History:      FAMILY HISTORY:      Vital Signs Last 24 Hrs  T(C): 36.7 (07 May 2021 12:05), Max: 37 (07 May 2021 05:05)  T(F): 98 (07 May 2021 12:05), Max: 98.6 (07 May 2021 05:05)  HR: 77 (07 May 2021 12:05) (76 - 87)  BP: 91/54 (07 May 2021 12:05) (91/54 - 114/62)  BP(mean): --  RR: 18 (07 May 2021 12:05) (17 - 18)  SpO2: 97% (07 May 2021 12:05) (94% - 97%)    PHYSICAL EXAM:  Vascular: DP & PT palpable bilaterally, Capillary refill 3 seconds, Digital hair present bilaterally. 2+ edema to leg and dorsal foot b/l.  No ecchymosis b/l  Neurological: Light touch sensation intact bilaterally  Musculoskeletal: 5/5 strength in all quadrants bilaterally, AJ & STJ ROM intact  Dermatological:  Superficial wounds anterior leg b/l.  Epithelial tissue pealing b/l anterior legs withe granular base.  Periwound macerated.  No PTB or SOI.  No tunneling, undermining, malodor, purulence or streaking.  Serous drainage  Thick discolored pedal nails 1-10 with subungual debris with malodor  Maggots between interspaces 1-4 b/l                                     8.8    4.81  )-----------( 236      ( 06 May 2021 07:32 )             28.4   05-06    144  |  110<H>  |  17  ----------------------------<  96  3.8   |  30  |  0.86    Ca    8.3<L>      06 May 2021 07:32                Imaging: ----------  EXAM: US DPLX LWR EXT VEINS COMPL BI      PROCEDURE DATE: 05/04/2021        INTERPRETATION: CLINICAL INFORMATION: Lower extremity edema.    COMPARISON: None available.    TECHNIQUE: Duplex sonography of the BILATERAL LOWER extremity veins with color and spectral Doppler, with and without compression.    FINDINGS:    RIGHT:    Normal compressibility of the RIGHT common femoral, femoral and popliteal veins.  Doppler examination shows normal spontaneous and phasic flow.  No RIGHT calf vein thrombosis is detected.    LEFT:    Normal compressibility of the LEFT common femoral, femoral and popliteal veins.  Doppler examination shows normal spontaneous and phasic flow.  No LEFT calf vein thrombosis is detected.    IMPRESSION:    No evidence of deep venous thrombosis in either lower extremity.  
Patient is a 71y old  Male who presents with a chief complaint of weakness (05 May 2021 13:10)      INTERVAL HPI/OVERNIGHT EVENTS: stable for dc no new events    MEDICATIONS  (STANDING):  carbidopa/levodopa CR 25/100 2 Tablet(s) Oral two times a day  enoxaparin Injectable 40 milliGRAM(s) SubCutaneous daily  nystatin Ointment 1 Application(s) Topical two times a day  nystatin Powder 1 Application(s) Topical two times a day    MEDICATIONS  (PRN):  acetaminophen    Suspension .. 650 milliGRAM(s) Oral every 6 hours PRN Temp greater or equal to 38C (100.4F), Moderate Pain (4 - 6)      Allergies    No Known Allergies    Intolerances        REVIEW OF SYSTEMS:  CONSTITUTIONAL: No fever, weight loss, or fatigue  EYES: No eye pain, visual disturbances  ENMT:  No difficulty hearing, tinnitus, vertigo; No sinus or throat pain  NECK: No pain or stiffness  RESPIRATORY: No cough, wheezing, chills or hemoptysis; No shortness of breath  CARDIOVASCULAR: No chest pain, palpitations, dizziness  GASTROINTESTINAL: No abdominal or epigastric pain. No nausea, vomiting, or hematemesis; No diarrhea or constipation. No melena or hematochezia.  GENITOURINARY: No dysuria, frequency, hematuria, or incontinence  NEUROLOGICAL: No headaches, memory loss, loss of strength, numbness, or tremors  SKIN: No itching, burning  LYMPH NODES: No enlarged glands  MUSCULOSKELETAL: No joint pain or swelling; No muscle, back, or extremity pain  PSYCHIATRIC: No depression, mood swings  HEME/LYMPH: No easy bruising, or bleeding gums  ALLERGY AND IMMUNOLOGIC: No hives    Vital Signs Last 24 Hrs  T(C): 37.4 (06 May 2021 05:13), Max: 37.4 (06 May 2021 05:13)  T(F): 99.3 (06 May 2021 05:13), Max: 99.3 (06 May 2021 05:13)  HR: 80 (06 May 2021 05:13) (76 - 83)  BP: 110/61 (06 May 2021 05:13) (92/48 - 110/61)  BP(mean): --  RR: 18 (06 May 2021 05:13) (17 - 18)  SpO2: 92% (06 May 2021 05:13) (92% - 96%)    PHYSICAL EXAM:  GENERAL: NAD, well-groomed, well-developed  HEAD:  Atraumatic, Normocephalic  EYES: EOMI, PERRLA, conjunctiva and sclera clear  ENMT: No tonsillar erythema, exudates, or enlargement   NECK: Supple, No JVD  NERVOUS SYSTEM:  Alert & Oriented X3, Good concentration  CHEST/LUNG: Clear to auscultation bilaterally; No rales, rhonchi, wheezing  HEART: Regular rate and rhythm  ABDOMEN: Soft, Nontender, Nondistended; Bowel sounds present  EXTREMITIES:  2+ Peripheral Pulses   LYMPH: No lymphadenopathy noted  SKIN: No rashes     LABS:                        8.8    4.81  )-----------( 236      ( 06 May 2021 07:32 )             28.4     06 May 2021 07:32    144    |  110    |  17     ----------------------------<  96     3.8     |  30     |  0.86     Ca    8.3        06 May 2021 07:32      PT/INR - ( 04 May 2021 11:10 )   PT: 14.0 sec;   INR: 1.21 ratio         PTT - ( 04 May 2021 11:10 )  PTT:32.0 sec  Urinalysis Basic - ( 04 May 2021 11:17 )    Color: Yellow / Appearance: Slightly Turbid / S.010 / pH: x  Gluc: x / Ketone: Trace  / Bili: Negative / Urobili: Negative   Blood: x / Protein: 30 mg/dL / Nitrite: Negative   Leuk Esterase: Moderate / RBC: 3-5 /HPF / WBC >50   Sq Epi: x / Non Sq Epi: Occasional / Bacteria: Many      CAPILLARY BLOOD GLUCOSE        blood culture --   No growth to date.    @ 15:02      urine culture --   @ 15:02  results   No growth to date.  @ 15:02    wound with gram statin --     @ 15:02  organism  --    @ 15:02  specimen source .Blood Blood-Peripheral   @ 15:02      RADIOLOGY & ADDITIONAL TESTS:      Consultant(s) Notes Reviewed:  [xs ] YES  [ ] NO    Care Discussed with Consultants/Other Providers [x ] YES  [ ] NO    Advanced care planning discussed with patient and family, advanced care planning forms reviewed, discussed, and completed.  20 minutes spent.  
    71y year old Male seen at [facility] APER 03 for b/l superficial anterior leg wound and maggots on feet.  Pt was seen bedside AAOx3 with NAD.  Dressings were clean, dry and intact b/l.  Denies any fever, chills, nausea, vomiting, chest pain, shortness of breath, or calf pain at this time.    REVIEW OF SYSTEMS    CONSTITUTIONAL: No fever, weight loss, or fatigue  EYES: No eye pain, visual disturbances  ENMT:  No difficulty hearing, tinnitus, vertigo; No sinus or throat pain  NECK: No pain or stiffness  RESPIRATORY: No cough, wheezing, chills or hemoptysis; No shortness of breath  CARDIOVASCULAR: No chest pain, palpitations, dizziness  GASTROINTESTINAL: No abdominal or epigastric pain. No nausea, vomiting, or hematemesis; No diarrhea or constipation. No melena or hematochezia.  GENITOURINARY: No dysuria, frequency, hematuria, or incontinence  NEUROLOGICAL: No headaches, memory loss, loss of strength, numbness, or tremors  SKIN: No itching, burning  LYMPH NODES: No enlarged glands  AST MEDICAL & SURGICAL HISTORY:  Parkinson disease    No significant past surgical history        Allergies    No Known Allergies    Intolerances        MEDICATIONS  (STANDING):    MEDICATIONS  (PRN):      Social History:      FAMILY HISTORY:      Vital Signs Last 24 Hrs  T(C): 37.4 (06 May 2021 05:13), Max: 37.4 (06 May 2021 05:13)  T(F): 99.3 (06 May 2021 05:13), Max: 99.3 (06 May 2021 05:13)  HR: 80 (06 May 2021 05:13) (76 - 83)  BP: 110/61 (06 May 2021 05:13) (92/48 - 110/61)  BP(mean): --  RR: 18 (06 May 2021 05:13) (17 - 18)  SpO2: 92% (06 May 2021 05:13) (92% - 96%)    PHYSICAL EXAM:  Vascular: DP & PT palpable bilaterally, Capillary refill 3 seconds, Digital hair present bilaterally. 2+ edema to leg and dorsal foot b/l.  No ecchymosis b/l  Neurological: Light touch sensation intact bilaterally  Musculoskeletal: 5/5 strength in all quadrants bilaterally, AJ & STJ ROM intact  Dermatological:  Superficial wounds anterior leg b/l.  Epithelial tissue pealing b/l anterior legs withe granular base.  Periwound macerated.  No PTB or SOI.  No tunneling, undermining, malodor, purulence or streaking.  Serous drainage  Thick discolored pedal nails 1-10 with subungual debris with malodor  Maggots between interspaces 1-4 b/l                          8.8    4.81  )-----------( 236      ( 06 May 2021 07:32 )             28.4         ----------CHEM PANEL----------  05-06    144  |  110<H>  |  17  ----------------------------<  96  3.8   |  30  |  0.86    Ca    8.3<L>      06 May 2021 07:32            Imaging: ----------  EXAM: US DPLX LWR EXT VEINS COMPL BI      PROCEDURE DATE: 05/04/2021        INTERPRETATION: CLINICAL INFORMATION: Lower extremity edema.    COMPARISON: None available.    TECHNIQUE: Duplex sonography of the BILATERAL LOWER extremity veins with color and spectral Doppler, with and without compression.    FINDINGS:    RIGHT:    Normal compressibility of the RIGHT common femoral, femoral and popliteal veins.  Doppler examination shows normal spontaneous and phasic flow.  No RIGHT calf vein thrombosis is detected.    LEFT:    Normal compressibility of the LEFT common femoral, femoral and popliteal veins.  Doppler examination shows normal spontaneous and phasic flow.  No LEFT calf vein thrombosis is detected.    IMPRESSION:    No evidence of deep venous thrombosis in either lower extremity.  
Patient is a 71y old  Male who presents with a chief complaint of weakness (06 May 2021 13:18)      INTERVAL HPI/OVERNIGHT EVENTS: stable for dc today    MEDICATIONS  (STANDING):  carbidopa/levodopa CR 25/100 2 Tablet(s) Oral two times a day  enoxaparin Injectable 40 milliGRAM(s) SubCutaneous daily  nystatin Ointment 1 Application(s) Topical two times a day  nystatin Powder 1 Application(s) Topical two times a day    MEDICATIONS  (PRN):  acetaminophen    Suspension .. 650 milliGRAM(s) Oral every 6 hours PRN Temp greater or equal to 38C (100.4F), Moderate Pain (4 - 6)      Allergies    No Known Allergies    Intolerances        REVIEW OF SYSTEMS:  CONSTITUTIONAL: No fever, weight loss, or fatigue  EYES: No eye pain, visual disturbances  ENMT:  No difficulty hearing, tinnitus, vertigo; No sinus or throat pain  NECK: No pain or stiffness  RESPIRATORY: No cough, wheezing, chills or hemoptysis; No shortness of breath  CARDIOVASCULAR: No chest pain, palpitations, dizziness  GASTROINTESTINAL: No abdominal or epigastric pain. No nausea, vomiting, or hematemesis; No diarrhea or constipation. No melena or hematochezia.  GENITOURINARY: No dysuria, frequency, hematuria, or incontinence  NEUROLOGICAL: No headaches, memory loss, loss of strength, numbness, or tremors  SKIN: right groin lesion   LYMPH NODES: No enlarged glands  MUSCULOSKELETAL: No joint pain or swelling; No muscle, back, or extremity pain  PSYCHIATRIC: No depression, mood swings  HEME/LYMPH: No easy bruising, or bleeding gums  ALLERGY AND IMMUNOLOGIC: No hives    Vital Signs Last 24 Hrs  T(C): 37 (07 May 2021 05:05), Max: 37 (07 May 2021 05:05)  T(F): 98.6 (07 May 2021 05:05), Max: 98.6 (07 May 2021 05:05)  HR: 76 (07 May 2021 05:05) (76 - 87)  BP: 114/62 (07 May 2021 05:05) (96/60 - 114/62)  BP(mean): --  RR: 18 (07 May 2021 09:23) (17 - 18)  SpO2: 96% (07 May 2021 09:23) (94% - 96%)    PHYSICAL EXAM:  GENERAL: NAD, well-groomed, well-developed  HEAD:  Atraumatic, Normocephalic  EYES: EOMI, PERRLA, conjunctiva and sclera clear  ENMT: No tonsillar erythema, exudates, or enlargement   NECK: Supple, No JVD  NERVOUS SYSTEM:  Alert & Oriented X3, Good concentration  CHEST/LUNG: Clear to auscultation bilaterally; No rales, rhonchi, wheezing  HEART: Regular rate and rhythm  ABDOMEN: Soft, Nontender, Nondistended; Bowel sounds present  EXTREMITIES:  2+ Peripheral Pulses   LYMPH: No lymphadenopathy noted  SKIN: right groin leison non necrotic, non bleeding  LABS:      Ca    8.3        06 May 2021 07:32          CAPILLARY BLOOD GLUCOSE        blood culture --   >=3 organisms. Probable collection contamination.   05-04 @ 15:05    blood culture --   No growth to date.   05-04 @ 15:02      urine culture --  05-04 @ 15:05  results   >=3 organisms. Probable collection contamination. 05-04 @ 15:05  urine culture --  05-04 @ 15:02  results   No growth to date. 05-04 @ 15:02    wound with gram statin --    05-04 @ 15:05  organism  --   05-04 @ 15:05  specimen source .Urine Clean Catch (Midstream)  05-04 @ 15:05  wound with gram statin --    05-04 @ 15:02  organism  --   05-04 @ 15:02  specimen source .Blood Blood-Peripheral  05-04 @ 15:02      RADIOLOGY & ADDITIONAL TESTS:      Consultant(s) Notes Reviewed:  [ ] YES  [ ] NO    Care Discussed with Consultants/Other Providers [ ] YES  [ ] NO    Advanced care planning discussed with patient and family, advanced care planning forms reviewed, discussed, and completed.  20 minutes spent.  
Patient is a 71y old  Male who presents with a chief complaint of weakness (04 May 2021 16:56)      INTERVAL HPI/OVERNIGHT EVENTS: stable, more awake      MEDICATIONS  (STANDING):  carbidopa/levodopa CR 25/100 2 Tablet(s) Oral two times a day  cefTRIAXone   IVPB 1000 milliGRAM(s) IV Intermittent every 24 hours  enoxaparin Injectable 40 milliGRAM(s) SubCutaneous daily  nystatin Ointment 1 Application(s) Topical two times a day  nystatin Powder 1 Application(s) Topical two times a day  sodium chloride 0.9%. 1000 milliLiter(s) (50 mL/Hr) IV Continuous <Continuous>    MEDICATIONS  (PRN):  acetaminophen    Suspension .. 650 milliGRAM(s) Oral every 6 hours PRN Temp greater or equal to 38C (100.4F), Moderate Pain (4 - 6)      Allergies    No Known Allergies    Intolerances        REVIEW OF SYSTEMS:  CONSTITUTIONAL: No fever, weight loss, or fatigue  EYES: No eye pain, visual disturbances  ENMT:  No difficulty hearing, tinnitus, vertigo; No sinus or throat pain  NECK: No pain or stiffness  RESPIRATORY: No cough, wheezing, chills or hemoptysis; No shortness of breath  CARDIOVASCULAR: No chest pain, palpitations, dizziness  GASTROINTESTINAL: No abdominal or epigastric pain. No nausea, vomiting, or hematemesis; No diarrhea or constipation. No melena or hematochezia.  GENITOURINARY: No dysuria, frequency, hematuria, or incontinence  NEUROLOGICAL: No headaches, memory loss, loss of strength, numbness, or tremors  SKIN: No itching, burning  LYMPH NODES: No enlarged glands  MUSCULOSKELETAL: No joint pain or swelling; No muscle, back, or extremity pain  PSYCHIATRIC: No depression, mood swings  HEME/LYMPH: No easy bruising, or bleeding gums  ALLERGY AND IMMUNOLOGIC: No hives    Vital Signs Last 24 Hrs  T(C): 37.1 (05 May 2021 04:55), Max: 37.1 (05 May 2021 04:55)  T(F): 98.8 (05 May 2021 04:55), Max: 98.8 (05 May 2021 04:55)  HR: 92 (05 May 2021 04:55) (83 - 95)  BP: 94/46 (05 May 2021 04:55) (90/56 - 110/64)  BP(mean): --  RR: 18 (05 May 2021 04:55) (17 - 18)  SpO2: 91% (05 May 2021 04:55) (91% - 98%)    PHYSICAL EXAM:  GENERAL: NAD, well-groomed, well-developed  HEAD:  Atraumatic, Normocephalic  EYES: EOMI, PERRLA, conjunctiva and sclera clear  ENMT: No tonsillar erythema, exudates, or enlargement   NECK: Supple, No JVD  NERVOUS SYSTEM:  Alert & awake  CHEST/LUNG: Clear to auscultation bilaterally; No rales, rhonchi, wheezing  HEART: Regular rate and rhythm  ABDOMEN: Soft, Nontender, Nondistended; Bowel sounds present  EXTREMITIES:  2+ Peripheral Pulses , edema with redness b/l le  LYMPH: No lymphadenopathy noted  SKIN: No rashes     LABS:                        8.8    5.84  )-----------( 235      ( 05 May 2021 07:18 )             27.1     05 May 2021 07:18    144    |  111    |  15     ----------------------------<  75     3.8     |  28     |  0.82     Ca    8.4        05 May 2021 07:18  Mg     2.3       04 May 2021 11:10    TPro  7.8    /  Alb  3.4    /  TBili  0.4    /  DBili  x      /  AST  28     /  ALT  21     /  AlkPhos  79     04 May 2021 11:10    PT/INR - ( 04 May 2021 11:10 )   PT: 14.0 sec;   INR: 1.21 ratio         PTT - ( 04 May 2021 11:10 )  PTT:32.0 sec  Urinalysis Basic - ( 04 May 2021 11:17 )    Color: Yellow / Appearance: Slightly Turbid / S.010 / pH: x  Gluc: x / Ketone: Trace  / Bili: Negative / Urobili: Negative   Blood: x / Protein: 30 mg/dL / Nitrite: Negative   Leuk Esterase: Moderate / RBC: 3-5 /HPF / WBC >50   Sq Epi: x / Non Sq Epi: Occasional / Bacteria: Many      CAPILLARY BLOOD GLUCOSE                RADIOLOGY & ADDITIONAL TESTS:      Consultant(s) Notes Reviewed:  [x ] YES  [ ] NO    Care Discussed with Consultants/Other Providers [x ] YES  [ ] NO    Advanced care planning discussed with patient and family, advanced care planning forms reviewed, discussed, and completed.  20 minutes spent.

## 2021-05-07 NOTE — PROGRESS NOTE ADULT - PROBLEM SELECTOR PLAN 2
continue home dose of carbidopa  pt and sw eval
podiatry eval noted  will need outpt wound care fu  pt eval noted for dc to [facility]
continue home dose of carbidopa  pt and sw eval

## 2021-05-07 NOTE — DISCHARGE NOTE NURSING/CASE MANAGEMENT/SOCIAL WORK - NSDCVIVACCINE_GEN_ALL_CORE_FT
Severe acute respiratory syndrome coronavirus 2 (SARS-CoV-2) (Coronavirus disease [COVID-19]) vaccine , 2021/5/5 16:43 , Jairo Serrato (ASHLYN)

## 2021-05-09 LAB
CULTURE RESULTS: SIGNIFICANT CHANGE UP
CULTURE RESULTS: SIGNIFICANT CHANGE UP
SPECIMEN SOURCE: SIGNIFICANT CHANGE UP
SPECIMEN SOURCE: SIGNIFICANT CHANGE UP

## 2021-10-26 PROBLEM — G20 PARKINSON'S DISEASE: Chronic | Status: ACTIVE | Noted: 2021-05-04

## 2022-03-03 PROBLEM — Z00.00 ENCOUNTER FOR PREVENTIVE HEALTH EXAMINATION: Status: ACTIVE | Noted: 2022-03-03

## 2022-04-06 ENCOUNTER — APPOINTMENT (OUTPATIENT)
Dept: NEUROLOGY | Facility: CLINIC | Age: 72
End: 2022-04-06
Payer: MEDICARE

## 2022-04-06 VITALS
WEIGHT: 155 LBS | HEIGHT: 72 IN | BODY MASS INDEX: 20.99 KG/M2 | DIASTOLIC BLOOD PRESSURE: 82 MMHG | HEART RATE: 84 BPM | SYSTOLIC BLOOD PRESSURE: 130 MMHG

## 2022-04-06 PROCEDURE — 99205 OFFICE O/P NEW HI 60 MIN: CPT

## 2022-04-06 RX ORDER — CARBIDOPA AND LEVODOPA 50; 200 MG/1; MG/1
50-200 TABLET, EXTENDED RELEASE ORAL
Qty: 30 | Refills: 5 | Status: ACTIVE | COMMUNITY
Start: 2022-04-06

## 2022-04-06 RX ORDER — CARBIDOPA AND LEVODOPA 25; 100 MG/1; MG/1
25-100 TABLET ORAL
Qty: 180 | Refills: 5 | Status: ACTIVE | COMMUNITY
Start: 2022-04-06

## 2022-04-06 NOTE — PHYSICAL EXAM
[Person] : oriented to person [Place] : oriented to place [Time] : oriented to time [Naming Objects] : no difficulty naming common objects [Repeating Phrases] : no difficulty repeating a phrase [Writing A Sentence] : no difficulty writing a sentence [Fluency] : fluency intact [Comprehension] : comprehension intact [Reading] : reading intact [Cranial Nerves Optic (II)] : visual acuity intact bilaterally,  visual fields full to confrontation, pupils equal round and reactive to light [Cranial Nerves Oculomotor (III)] : extraocular motion intact [Cranial Nerves Trigeminal (V)] : facial sensation intact symmetrically [Cranial Nerves Facial (VII)] : face symmetrical [Cranial Nerves Vestibulocochlear (VIII)] : hearing was intact bilaterally [Cranial Nerves Glossopharyngeal (IX)] : tongue and palate midline [Cranial Nerves Accessory (XI - Cranial And Spinal)] : head turning and shoulder shrug symmetric [Cranial Nerves Hypoglossal (XII)] : there was no tongue deviation with protrusion [Motor Strength] : muscle strength was normal in all four extremities [Motor Handedness Right-Handed] : the patient is right hand dominant [2 - Mild: Loss of modulation, diction, or volume, with a few words unclear, but the overall sentences easy to follow] : Speech - 2 [2 - Mild: In addition to decreased eye-blink frequency, masked facies present in the lower face as well, namely fewer movements around the mouth, such as less spontaneous smiling, but lips not parted.] : Facial expression - 2 [1 - Slight: Rigidity only detected with activation maneuver] : Rigidity - Upper extremity: left - 1 [2 - Mild: Rigidity detected without the activation maneuver, but full range of motion is easily achieved.] : Rigidity - Lower extremity: right - 2 [0 - Normal: No rigidity] : Rigidity - Lower extremity: left - 0 [3 - Moderate: Any of the following: a) more than 5 interruptions during tapping or at least one longer arrest (freeze) in ongoing movement; b) moderate slowing; c) the amplitude decrements starting after the 1st tap.] : Finger tapping: right - 3 [2 - Mild: Any of the following: a) 3 to 5 interruptions during tapping; b) mild slowing; c) the amplitude decrements midway in the 10-tap sequence.] : Finger tapping: left - 2 [3 - Moderate: Any of the following: a) more than 5 interruptions during the movement or at least one longer arrest (freeze) in ongoing movement; b) moderate slowing; c) the amplitude decrements starting ater the 1st open-and-close sequence.] : Hand movements: right - 3 [2 - Mild: Any of the following: a) 3 to 5 interruptions during the movements; b) mild slowing; c) the amplitude decrements midway in the task.] : Hand movements: left - 2 [3 - Moderate: Any of the following: a) more than 5 interruptions during the movement or at least one longer arrest (freeze) in ongoing movement; b) moderate slowing; c) the amplitude decrements starting after the 1st supination-pronation sequence.] : Pronation-supination movements of hands: right - 3 [2 - Mild: Any of the following: a) 3 to 5 interruptions during the movements; b) mild slowing; c) the amplitude decrements midway in the sequence.] : Pronation-supination movements of hands: left - 2 [2 - Mild: Any of the following: a) 3 to 5 interruptions during tapping; b) mild slowing; c) the amplitude decrements midway in the task.] : Toe tapping: left - 2 [1 - Slight: Any of the following: a) the regular rhythm is broken with one or two interruptions or hesitations of the movement; b) slight slowing; c) amplitude decrements near the end of the task.] : Leg agility: left - 1 [0 - Normal: No problems. Able to arise quickly without hesitation] : Arise from chair - 0 [1 - Slight: Independent walking with minor gait impairment.] : Gait - 1 [0 - Normal: No freezing] : Freezing of gait - 0 [0 - Normal: No problems. Recovers with one or two steps.] : Postural stability - 0 [2 - Mild: Definite flexion, scoliosis or leaning to one side, but patient can correct posture to normal posture when asked to do so.] : Posture - 2 [1 - Slight: Slight global slowness and poverty of spontaneous movements.] : Body bradykinesia - 1 [2 - Mild: Tremor is at least 1 but less than 3 cm in amplitude.] : Postural tremor of the hands: right - 2 [1 - Slight: Tremor is present but less than 1 cm in amplitude.] : Kinetic tremor of the hands: left - 1 [2 - Mild: >= 1 cm but < 2 cm in maximal amplitude.] : Tremor Amplitude: Lip/Jaw - 2 [2 - Mild: >= 1 cm but < 3 cm in maximal amplitude.] : Rest tremor amplitude - Upper extremity: right - 2 [1 - Slight: < 1 cm in maximal amplitude] : Rest tremor amplitude - Upper extremity: left - 1 [0 - Normal: No tremor.] : Rest tremor amplitude - Lower extremity: left - 0 [4 - Severe: Tremor at rest is present > 75% of the entire examination period.] : Constancy of rest tremor  - 4 [Sensation Tactile Decrease] : light touch was intact [Sensation Vibration Decrease] : vibration was intact [2+] : Brachioradialis left 2+ [0] : Ankle jerk left 0 [Dysdiadochokinesia Bilaterally] : not present [Coordination - Dysmetria Impaired Finger-to-Nose Bilateral] : not present [Plantar Reflex Right Only] : normal on the right [Plantar Reflex Left Only] : normal on the left [FreeTextEntry1] : His last dose of levodopa was 8 hours ago.  \par \par His speech was monotonous and his facial expression was slightly reduced.  He had moderate rigidity in his neck as well as the right upper and lower extremity.  He had mild rigidity left upper extremity and tone was normal in the left leg.  Shoulder shrug was decreased on the right.  Rapid alternating movements, finger tap, and pronation/supination were slowed bilaterally, worse on the right.  Foot tap was relatively normal but toe tap was slowed on the right more than the left.  He is to get up from the chair without using his arms.  Gait was normal based with slightly shortened stride and a stooped posture.  Pull test was negative.\par He had a moderate jaw tremor, and throughout the examination had a right greater than left resting tremor.  He also had a right greater than left postural tremor and a mild bilateral intention tremor.  Spiral drawing was tighter on the right and handwriting was micrographic

## 2022-04-06 NOTE — DISCUSSION/SUMMARY
[FreeTextEntry1] : In summary, the patient is a 72-year-old right-handed man with a history of tremors since 2015 and a diagnosis of Parkinson's disease.  The examination was significant for right greater than left rigidity, bradykinesia, and tremor.  Overall, I agree that this examination is most consistent with idiopathic Parkinson's disease.  There were no findings that would point to an atypical parkinsonian disorder.  Moreover, he does report a good response to levodopa.\par \par At this point he is only been on levodopa.  I do think the dose is probably are sufficient, though he does take it rather erratically.  We discussed that taking the ER during the day is not necessarily can last 8 hours.  Thus, I did recommend that he take that only when he is sleeping and take the IR version every 4 hours when he is awake.  If he is still rigid and slow, he may need to take 1-1/2 pills of IR.  This is a little bit complicated by his sleep schedule but I do think he would do bit better with the IR when he is awake.  As of yet, I do not think we need to add additional medications, though that can be discussed in the future.\par \par His only significant nonmotor symptom seems to be constipation which she is managing by eating a lot of fiber.  I advised that he can add MiraLAX if needed.\par \par I spent approximately 60 minutes with the patient, examining and discussing the above findings and impression.  Follow-up will be in 6 months, sooner if new problems arise, and he will continue to see his regular neurologist.

## 2022-04-06 NOTE — CONSULT LETTER
[Dear  ___] : Dear  [unfilled], [Consult Letter:] : I had the pleasure of evaluating your patient, [unfilled]. [Please see my note below.] : Please see my note below. [Consult Closing:] : Thank you very much for allowing me to participate in the care of this patient.  If you have any questions, please do not hesitate to contact me. [FreeTextEntry2] : Dr Lina Sung\par [id number] [facility] [address]\[place] [address] [FreeTextEntry3] : Harish Renee MD, PhD\par Attending Neurologist\par Division of Movement Disorders\par  [facility]\par  of Neurology\par [employer] [facility] [employer] [facility]\par \par

## 2022-04-06 NOTE — HISTORY OF PRESENT ILLNESS
[FreeTextEntry1] : The patient is a 72-year-old right-handed man who developed tremors of his jaw followed by his right and left hand around 2015.  He was diagnosed with Parkinson disease in 2017 and started taking levodopa.  This does seem to be helping him, and he has not been on any other antiparkinsonian medications.\par \par In fact, during the pandemic he ran out of his medications, which worsened his balance and he had a fall that required hospitalization and rehab.  He is now back home.  He is taking the levodopa 25/100, 4-5 times a day, as well as ER 50/200 overnight.  He does feel it working after 30 minutes, and it sometimes resolves his tremors.  He does have some wearing off but no dose failures or dyskinesias.  However, this is complicated by him being an extreme night person his whole life, such that he often sleeps during the day.  Sometimes he takes the ER version instead and nothing else for 8 hours.  In fact this is what he did today.\par \par His voice and facial expression are decreased.  He does have drooling when he was out of his medications but not currently.  He has no dysphagia.  He has no trouble turning over in bed.  His handwriting is slower.  He is independent in activities of daily living, though slow.  He does live by himself.  He had no recent falls.  The tremors are present in his jaw and right more than the left hand, without leg tremors.\par He has no memory complaints and no depression or anxiety.  He has no hallucinations.  As far as you know he does not I doubt his dreams.  He is a retired .  He does have some constipation which she treats with fiber and an occasional laxative.  He has no urinary issues or orthostasis.  There is no family history of Parkinson's disease or other movement disorders.

## 2022-10-12 ENCOUNTER — APPOINTMENT (OUTPATIENT)
Dept: NEUROLOGY | Facility: CLINIC | Age: 72
End: 2022-10-12

## 2022-10-12 VITALS — HEART RATE: 94 BPM | SYSTOLIC BLOOD PRESSURE: 106 MMHG | DIASTOLIC BLOOD PRESSURE: 69 MMHG

## 2022-10-12 DIAGNOSIS — G20 PARKINSON'S DISEASE: ICD-10-CM

## 2022-10-12 PROCEDURE — 99214 OFFICE O/P EST MOD 30 MIN: CPT

## 2022-10-12 NOTE — PHYSICAL EXAM
[Person] : oriented to person [Place] : oriented to place [Time] : oriented to time [Naming Objects] : no difficulty naming common objects [Repeating Phrases] : no difficulty repeating a phrase [Writing A Sentence] : no difficulty writing a sentence [Fluency] : fluency intact [Comprehension] : comprehension intact [Reading] : reading intact [Cranial Nerves Optic (II)] : visual acuity intact bilaterally,  visual fields full to confrontation, pupils equal round and reactive to light [Cranial Nerves Oculomotor (III)] : extraocular motion intact [Cranial Nerves Trigeminal (V)] : facial sensation intact symmetrically [Cranial Nerves Facial (VII)] : face symmetrical [Cranial Nerves Vestibulocochlear (VIII)] : hearing was intact bilaterally [Cranial Nerves Glossopharyngeal (IX)] : tongue and palate midline [Cranial Nerves Accessory (XI - Cranial And Spinal)] : head turning and shoulder shrug symmetric [Motor Strength] : muscle strength was normal in all four extremities [Motor Handedness Right-Handed] : the patient is right hand dominant [Sensation Tactile Decrease] : light touch was intact [Sensation Vibration Decrease] : vibration was intact [2+] : Brachioradialis left 2+ [Short Term Intact] : short term memory intact [Registration Intact] : recent registration memory intact [Dysdiadochokinesia Bilaterally] : not present [Coordination - Dysmetria Impaired Finger-to-Nose Bilateral] : not present [0] : Ankle jerk left 0 [Plantar Reflex Right Only] : normal on the right [Plantar Reflex Left Only] : normal on the left [FreeTextEntry1] : His last dose of levodopa was 5 hours ago.  \par \par His speech was monotonous and his facial expression was slightly reduced.  He had moderate rigidity in his neck as well as the right upper and lower extremity.  He had mild rigidity left upper extremity and tone was normal in the left leg.  \par \par Shoulder shrug was decreased on the right.  Rapid alternating movements, finger tap, and pronation/supination were slowed bilaterally, worse on the right.  Foot tap was relatively normal but toe tap was slowed on the right more than the left.  He was able to get up from the chair without using his arms.  Gait was normal based with slightly shortened stride and a stooped posture.  Pull test was negative.\par \par He had a moderate jaw tremor, and throughout the examination had a right greater than left resting tremor.  He also had a right greater than left postural tremor and a mild bilateral intention tremor.

## 2022-10-12 NOTE — DISCUSSION/SUMMARY
[FreeTextEntry1] : In summary, the patient is a 72-year-old right-handed man with a history of tremors since 2015 and a diagnosis of Parkinson's disease.  The examination was significant for right greater than left rigidity, bradykinesia, and tremor.  Overall, I agree that this examination is most consistent with idiopathic Parkinson's disease.  There were no findings that would point to an atypical parkinsonian disorder.  Moreover, he does report a good response to levodopa.\par \par 1. At this point he has only been on levodopa.  I do think the dose is probably are sufficient, though he does take it rather erratically.  We discussed that taking the ER during the day is not necessarily can last 8 hours.  Thus, I did recommend that he take that only when he is sleeping and take the IR version every 4 hours when he is awake.  If he is still rigid and slow, he may need to take 1-1/2 pills of IR.  This is a little bit complicated by his sleep schedule but I do think he would do bit better with the IR when he is awake.  \par 2. As of yet, I do not think we need to add additional medications, though that can be discussed in the future.\par 3. His only significant non-motor symptom seems to be constipation which she is managing by eating a lot of fiber.  I advised that he can add MiraLAX if needed. As for slow recall, can check B12, TSH\par \par We discussed the above impression, plan and recommendations during the visit. Counseling represented more then 50% of the 30 minute visit time

## 2022-10-12 NOTE — HISTORY OF PRESENT ILLNESS
[FreeTextEntry1] : The patient is a 72-year-old right-handed man who developed tremors of his jaw followed by his right and left hand around 2015.  He was diagnosed with Parkinson disease in 2017 and started taking levodopa.  This does seem to be helping him, and he has not been on any other antiparkinsonian medications.\par \par 1. He is taking the levodopa 25/100, 4-5 times a day, as well as ER 50/200 overnight.  He does feel it working after 30 minutes, and it sometimes resolves his tremors.  He does have some wearing off but no dose failures or dyskinesias.  However, this is complicated by him being an extreme night person his whole life, such that he often sleeps during the day.  Sometimes he takes the ER version instead and nothing else for 8 hours.  In fact this is what he did today.\par 2. His voice and facial expression are decreased.  He does have drooling when he was out of his medications but not currently.  He has no dysphagia.  He has no trouble turning over in bed.  His handwriting is slower.  He is independent in activities of daily living, though slow.  He does live by himself.  He had no recent falls.  The tremors are present in his jaw and right more than the left hand, without leg tremors.\par 3. He has no significant memory complaints (slow to recall, but then does) and no depression or anxiety.  He has no hallucinations.  As far as you know he does not act out his dreams.  He is a retired .  He does have some constipation which she treats with fiber and an occasional laxative.  He has no urinary issues or orthostasis.  There is no family history of Parkinson's disease or other movement disorders.

## 2022-11-08 NOTE — ED ADULT NURSE NOTE - NSIMPLEMENTINTERV_GEN_ALL_ED
Implemented All Fall with Harm Risk Interventions:  [place] to call system. Call bell, personal items and telephone within reach. Instruct patient to call for assistance. Room bathroom lighting operational. Non-slip footwear when patient is off stretcher. Physically safe environment: no spills, clutter or unnecessary equipment. Stretcher in lowest position, wheels locked, appropriate side rails in place. Provide visual cue, wrist band, yellow gown, etc. Monitor gait and stability. Monitor for mental status changes and reorient to person, place, and time. Review medications for side effects contributing to fall risk. Reinforce activity limits and safety measures with patient and family. Provide visual clues: red socks. No indicators present

## 2023-04-25 NOTE — ED ADULT NURSE NOTE - NSHOSCREENINGQ1_ED_ALL_ED
No new care gaps identified.  [facility] Embedded Care Gaps. Reference number: [id number]. 4/25/2023   7:48:08 AM CDT  
No

## 2025-04-30 NOTE — H&P ADULT - SEXUAL ORIENTATION
Defer Treatment (Provide Reason For Deferment In Text Field Below): Excision (patient does not want surgery at this time) or EDC Detail Level: Zone Straight, Heterosexual Plan: The BCC were clear to the eye clinically, but will check back in 6 months. Render In Strict Bullet Format?: No